# Patient Record
Sex: MALE | Race: ASIAN | NOT HISPANIC OR LATINO
[De-identification: names, ages, dates, MRNs, and addresses within clinical notes are randomized per-mention and may not be internally consistent; named-entity substitution may affect disease eponyms.]

---

## 2023-07-07 PROBLEM — Z00.00 ENCOUNTER FOR PREVENTIVE HEALTH EXAMINATION: Status: ACTIVE | Noted: 2023-07-07

## 2023-07-18 ENCOUNTER — APPOINTMENT (OUTPATIENT)
Dept: SURGERY | Facility: CLINIC | Age: 88
End: 2023-07-18
Payer: MEDICARE

## 2023-07-18 VITALS
BODY MASS INDEX: 22.51 KG/M2 | TEMPERATURE: 96.8 F | DIASTOLIC BLOOD PRESSURE: 73 MMHG | HEART RATE: 82 BPM | WEIGHT: 135.13 LBS | OXYGEN SATURATION: 98 % | SYSTOLIC BLOOD PRESSURE: 137 MMHG | HEIGHT: 64.96 IN

## 2023-07-18 PROCEDURE — 99204 OFFICE O/P NEW MOD 45 MIN: CPT

## 2023-07-18 NOTE — HISTORY OF PRESENT ILLNESS
[de-identified] : French translation via Tomas Rodrigues\par \par Patient is a 87 year old male with no PMHx/PSHx who presents with mass on the right hip presents for greater than 20 years. As per patient in the past month he noticed an episode of shooting pain to his left inguinal region, which caused concern that the mass might be the source of pain, and therefore presents for evaluation. However he denies any other similar episode and likewise states that otherwise, the mass has slowly increased in size. Denies smoking/EtOH/drug use. NDKA.

## 2023-07-18 NOTE — PHYSICAL EXAM
[Respiratory Effort] : normal respiratory effort [Normal Rate and Rhythm] : normal rate and rhythm [Alert] : alert [Oriented to Person] : oriented to person [Oriented to Place] : oriented to place [Oriented to Time] : oriented to time [Calm] : calm [de-identified] : NAD [de-identified] : Soft, non-distended, non-TTP in all quadrants, no rebound/guarding [de-identified] : Approximately 5 cm pedunculated, fleshy mass of the right hip, non-TTP

## 2023-07-18 NOTE — PLAN
[FreeTextEntry1] : \par - I spoke with the patient regarding the findings and offered him right hip mass excision\par - We discussed the indications, risks, benefits, and alternatives of the procedure, to which the patient stated that he understood, gave consent, and all his questions were answered\par - Patient to follow with PCP for medical risk stratification\par - Surgical planning